# Patient Record
Sex: FEMALE | Race: WHITE
[De-identification: names, ages, dates, MRNs, and addresses within clinical notes are randomized per-mention and may not be internally consistent; named-entity substitution may affect disease eponyms.]

---

## 2017-05-10 ENCOUNTER — HOSPITAL ENCOUNTER (EMERGENCY)
Dept: HOSPITAL 47 - EC | Age: 52
Discharge: HOME | End: 2017-05-10
Payer: COMMERCIAL

## 2017-05-10 VITALS
TEMPERATURE: 98.5 F | DIASTOLIC BLOOD PRESSURE: 81 MMHG | HEART RATE: 71 BPM | SYSTOLIC BLOOD PRESSURE: 140 MMHG | RESPIRATION RATE: 18 BRPM

## 2017-05-10 DIAGNOSIS — T25.222A: ICD-10-CM
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
## 2017-05-10 DIAGNOSIS — T25.221A: Primary | ICD-10-CM
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
## 2017-05-10 DIAGNOSIS — T31.0: ICD-10-CM

## 2017-05-10 DIAGNOSIS — Z88.1: ICD-10-CM

## 2017-05-10 PROCEDURE — 99283 EMERGENCY DEPT VISIT LOW MDM: CPT

## 2017-05-10 NOTE — ED
General Adult HPI





- General


Chief complaint: Burn/Smoke Inhalation


Stated complaint: [facility]/ Hot Liquid/ on feet


Time Seen by Provider: 05/10/17 19:47


Source: patient, RN notes reviewed


Mode of arrival: ambulatory


Limitations: no limitations





- History of Present Illness


Initial comments: 





Patient 51-year-old female who presents emergency room today with a chief 

complaint of a burn to the top of the feet bilaterally.  She does admit that 

around 11 AM this morning she was at work and spilled hot chicken soup down her 

left leg.  She does admit that he got into the socks both feet.  She admits 

that she did take it off and put ice.  She states that when she got home from 

work she noticed that there were some blisters to the first and second digit on 

the left foot along with a blister on the right foot.  Patient denies any other 

complaints or symptoms.  States is no other redness or blistering going down 

the leg.  She states her tetanus is up-to-date. Patient denies any recent fever

, chills, shortness of breath, chest pain, back pain, abdominal pain, nausea or 

vomiting, numbness or tingling, dysuria or hematuria, constipation or diarrhea, 

headaches or visual changes, or any other complaints.





- Related Data


 Previous Rx's











 Medication  Instructions  Recorded


 


Bacitracin Oint 28.4 gm TOPICAL BID #1 tube 05/10/17


 


Ibuprofen [Motrin] 600 mg PO Q6HR PRN #40 day 05/10/17











 Allergies











Allergy/AdvReac Type Severity Reaction Status Date / Time


 


cephalexin [From Keflex] Allergy  Swelling Verified 05/10/17 19:46














Review of Systems


ROS Statement: 


Those systems with pertinent positive or pertinent negative responses have been 

documented in the HPI.





ROS Other: All systems not noted in ROS Statement are negative.





Past Medical History


Past Medical History: No Reported History


History of Any Multi-Drug Resistant Organisms: None Reported


Past Surgical History: No Surgical Hx Reported


Past Psychological History: No Psychological Hx Reported


Smoking Status: Never smoker


Past Alcohol Use History: None Reported


Past Drug Use History: None Reported





General Exam





- General Exam Comments


Initial Comments: 





General:  The patient is awake and alert, in no distress, and does not appear 

acutely ill.   


Neck:  The neck is supple, there is no tenderness or JVD.  


Cardiovascular:  There is a regular rate and rhythm. No murmur, rub or gallop 

is appreciated.


Respiratory:  Lungs are clear to auscultation, respirations are non-labored, 

breath sounds are equal.  No wheezes, stridor, rales, or rhonchi.


Musculoskeletal: Range of motion.  Sensation intact.  Pulses equal bilaterally 2

+.  Strength 5/5.  Cap refill less than 2 seconds.


Neurological:  A&O x 3. CN II-XII intact, There are no obvious motor or sensory 

deficits. Coordination appears grossly intact. Speech is normal.


Skin:  Does have some mild redness to the medial aspect anteriorly of the right 

foot.  There is a blister measuring approximately a centimeter and 5:30 0.5 cm.

  Fluid-filled.  Second smaller blister measuring approximately half centimeter 

in size just medial to the first.  Patient does have blister locally over the 

first digit of the left foot measuring approximately centimeter across.  There 

are 2 small 0.5 cm blisters to the second digit of the left foot.  There is no 

circumferential redness or wound.  Cap refill less than 2 seconds.


Psychiatric:  Normal mood and affect.  


Limitations: no limitations





Course





 Vital Signs











  05/10/17





  19:42


 


Temperature 98.5 F


 


Pulse Rate 71


 


Respiratory 18





Rate 


 


Blood Pressure 140/81


 


O2 Sat by Pulse 100





Oximetry 














Medical Decision Making





- Medical Decision Making





His tetanus is up-to-date.  Patient will be placed on topical antibiotic given 

anti-inflammatories for pain.  She is advised to follow-up the family doctor 

and also given information of following up with her.  Patient advised to return 

to emergency room if any symptoms increase or worsen.





Disposition


Clinical Impression: 


 2Nd degree burn





Disposition: HOME SELF-CARE


Condition: Good


Instructions:  Second Degree Burn (ED)


Additional Instructions: 


Please use topical antibiotic as prescribed and pain medication as needed.  

Please call family doctor tomorrow hour also the burn center as discussed.  

Please return to emergency room if any symptoms increase or worsen or for any 

other concerns.


Prescriptions: 


Bacitracin Oint 28.4 gm TOPICAL BID #1 tube


Ibuprofen [Motrin] 600 mg PO Q6HR PRN #40 day


 PRN Reason: Pain


Time of Disposition: 19:58

## 2018-03-16 ENCOUNTER — HOSPITAL ENCOUNTER (OUTPATIENT)
Dept: HOSPITAL 47 - LABWHC1 | Age: 53
Discharge: HOME | End: 2018-03-16
Attending: INTERNAL MEDICINE
Payer: COMMERCIAL

## 2018-03-16 DIAGNOSIS — D50.9: Primary | ICD-10-CM

## 2018-03-16 LAB
BASOPHILS # BLD AUTO: 0.1 K/UL (ref 0–0.2)
BASOPHILS NFR BLD AUTO: 1 %
EOSINOPHIL # BLD AUTO: 0.1 K/UL (ref 0–0.7)
EOSINOPHIL NFR BLD AUTO: 2 %
ERYTHROCYTE [DISTWIDTH] IN BLOOD BY AUTOMATED COUNT: 5.01 M/UL (ref 3.8–5.4)
ERYTHROCYTE [DISTWIDTH] IN BLOOD: 17.1 % (ref 11.5–15.5)
HCT VFR BLD AUTO: 40.2 % (ref 34–46)
HGB BLD-MCNC: 14 GM/DL (ref 11.4–16)
LYMPHOCYTES # SPEC AUTO: 1.4 K/UL (ref 1–4.8)
LYMPHOCYTES NFR SPEC AUTO: 28 %
MCH RBC QN AUTO: 28 PG (ref 25–35)
MCHC RBC AUTO-ENTMCNC: 34.9 G/DL (ref 31–37)
MCV RBC AUTO: 80.3 FL (ref 80–100)
MONOCYTES # BLD AUTO: 0.3 K/UL (ref 0–1)
MONOCYTES NFR BLD AUTO: 6 %
NEUTROPHILS # BLD AUTO: 3.1 K/UL (ref 1.3–7.7)
NEUTROPHILS NFR BLD AUTO: 62 %
PLATELET # BLD AUTO: 321 K/UL (ref 150–450)
WBC # BLD AUTO: 5.1 K/UL (ref 3.8–10.6)

## 2018-03-16 PROCEDURE — 85025 COMPLETE CBC W/AUTO DIFF WBC: CPT

## 2018-03-16 PROCEDURE — 85045 AUTOMATED RETICULOCYTE COUNT: CPT

## 2018-03-16 PROCEDURE — 82728 ASSAY OF FERRITIN: CPT

## 2018-03-16 PROCEDURE — 83550 IRON BINDING TEST: CPT

## 2018-03-16 PROCEDURE — 83540 ASSAY OF IRON: CPT

## 2018-03-16 PROCEDURE — 36415 COLL VENOUS BLD VENIPUNCTURE: CPT

## 2019-07-02 ENCOUNTER — HOSPITAL ENCOUNTER (OUTPATIENT)
Dept: HOSPITAL 47 - LABWHC1 | Age: 54
Discharge: HOME | End: 2019-07-02
Attending: INTERNAL MEDICINE
Payer: COMMERCIAL

## 2019-07-02 DIAGNOSIS — D50.9: Primary | ICD-10-CM

## 2019-07-02 LAB
ALBUMIN SERPL-MCNC: 4.1 G/DL (ref 3.8–4.9)
ALBUMIN/GLOB SERPL: 2.05 G/DL (ref 1.6–3.17)
ALP SERPL-CCNC: 67 U/L (ref 41–126)
ALT SERPL-CCNC: 15 U/L (ref 8–44)
ANION GAP SERPL CALC-SCNC: 10.6 MMOL/L (ref 4–12)
AST SERPL-CCNC: 30 U/L (ref 13–35)
BUN SERPL-SCNC: 14 MG/DL (ref 9–27)
BUN/CREAT SERPL: 17.5 RATIO (ref 12–20)
CALCIUM SPEC-MCNC: 9.8 MG/DL (ref 8.7–10.3)
CHLORIDE SERPL-SCNC: 104 MMOL/L (ref 96–109)
CO2 SERPL-SCNC: 25.4 MMOL/L (ref 21.6–31.8)
ERYTHROCYTE [DISTWIDTH] IN BLOOD BY AUTOMATED COUNT: 4.81 M/UL (ref 3.8–5.4)
ERYTHROCYTE [DISTWIDTH] IN BLOOD: 13.8 % (ref 11.5–15.5)
GLOBULIN SER CALC-MCNC: 2 G/DL (ref 1.6–3.3)
GLUCOSE SERPL-MCNC: 89 MG/DL (ref 70–110)
HCT VFR BLD AUTO: 38.4 % (ref 34–46)
HGB BLD-MCNC: 12.3 GM/DL (ref 11.4–16)
MCH RBC QN AUTO: 25.7 PG (ref 25–35)
MCHC RBC AUTO-ENTMCNC: 32.2 G/DL (ref 31–37)
MCV RBC AUTO: 79.8 FL (ref 80–100)
PLATELET # BLD AUTO: 345 K/UL (ref 150–450)
POTASSIUM SERPL-SCNC: 3.6 MMOL/L (ref 3.5–5.5)
PROT SERPL-MCNC: 6.1 G/DL (ref 6.2–8.2)
SODIUM SERPL-SCNC: 140 MMOL/L (ref 135–145)
WBC # BLD AUTO: 4.5 K/UL (ref 3.8–10.6)

## 2019-07-02 PROCEDURE — 36415 COLL VENOUS BLD VENIPUNCTURE: CPT

## 2019-07-02 PROCEDURE — 83550 IRON BINDING TEST: CPT

## 2019-07-02 PROCEDURE — 82728 ASSAY OF FERRITIN: CPT

## 2019-07-02 PROCEDURE — 83540 ASSAY OF IRON: CPT

## 2019-07-02 PROCEDURE — 85027 COMPLETE CBC AUTOMATED: CPT

## 2019-07-02 PROCEDURE — 80053 COMPREHEN METABOLIC PANEL: CPT

## 2019-09-03 ENCOUNTER — HOSPITAL ENCOUNTER (OUTPATIENT)
Dept: HOSPITAL 47 - LABWHC1 | Age: 54
Discharge: HOME | End: 2019-09-03
Attending: INTERNAL MEDICINE
Payer: COMMERCIAL

## 2019-09-03 DIAGNOSIS — D50.9: Primary | ICD-10-CM

## 2019-09-03 LAB
ERYTHROCYTE [DISTWIDTH] IN BLOOD BY AUTOMATED COUNT: 5.3 M/UL (ref 3.8–5.4)
ERYTHROCYTE [DISTWIDTH] IN BLOOD: 17.7 % (ref 11.5–15.5)
GLIADIN IGA SER-ACNC: <0.2 U/ML
HCT VFR BLD AUTO: 44.5 % (ref 34–46)
HGB BLD-MCNC: 14.9 GM/DL (ref 11.4–16)
MCH RBC QN AUTO: 28.1 PG (ref 25–35)
MCHC RBC AUTO-ENTMCNC: 33.4 G/DL (ref 31–37)
MCV RBC AUTO: 84.1 FL (ref 80–100)
PLATELET # BLD AUTO: 300 K/UL (ref 150–450)
WBC # BLD AUTO: 5.8 K/UL (ref 3.8–10.6)

## 2019-09-03 PROCEDURE — 82728 ASSAY OF FERRITIN: CPT

## 2019-09-03 PROCEDURE — 83516 IMMUNOASSAY NONANTIBODY: CPT

## 2019-09-03 PROCEDURE — 85027 COMPLETE CBC AUTOMATED: CPT

## 2019-09-03 PROCEDURE — 83540 ASSAY OF IRON: CPT

## 2019-09-03 PROCEDURE — 36415 COLL VENOUS BLD VENIPUNCTURE: CPT

## 2019-09-03 PROCEDURE — 83550 IRON BINDING TEST: CPT

## 2019-11-04 ENCOUNTER — HOSPITAL ENCOUNTER (OUTPATIENT)
Dept: HOSPITAL 47 - LABWHC1 | Age: 54
Discharge: HOME | End: 2019-11-04
Attending: INTERNAL MEDICINE
Payer: COMMERCIAL

## 2019-11-04 DIAGNOSIS — D50.9: Primary | ICD-10-CM

## 2019-11-04 LAB
BASOPHILS # BLD AUTO: 0.1 K/UL (ref 0–0.2)
BASOPHILS NFR BLD AUTO: 1 %
EOSINOPHIL # BLD AUTO: 0.2 K/UL (ref 0–0.7)
EOSINOPHIL NFR BLD AUTO: 3 %
ERYTHROCYTE [DISTWIDTH] IN BLOOD BY AUTOMATED COUNT: 5.1 M/UL (ref 3.8–5.4)
ERYTHROCYTE [DISTWIDTH] IN BLOOD: 14.3 % (ref 11.5–15.5)
FERRITIN SERPL-MCNC: 29.7 NG/ML (ref 10–291)
HCT VFR BLD AUTO: 44.4 % (ref 34–46)
HGB BLD-MCNC: 14.9 GM/DL (ref 11.4–16)
IRON SERPL-MCNC: 78 UG/DL (ref 50–170)
LYMPHOCYTES # SPEC AUTO: 1.6 K/UL (ref 1–4.8)
LYMPHOCYTES NFR SPEC AUTO: 26 %
MCH RBC QN AUTO: 29.3 PG (ref 25–35)
MCHC RBC AUTO-ENTMCNC: 33.6 G/DL (ref 31–37)
MCV RBC AUTO: 87.1 FL (ref 80–100)
MONOCYTES # BLD AUTO: 0.3 K/UL (ref 0–1)
MONOCYTES NFR BLD AUTO: 6 %
NEUTROPHILS # BLD AUTO: 3.7 K/UL (ref 1.3–7.7)
NEUTROPHILS NFR BLD AUTO: 62 %
PLATELET # BLD AUTO: 323 K/UL (ref 150–450)
TIBC SERPL-MCNC: 366 UG/DL (ref 228–460)
WBC # BLD AUTO: 6 K/UL (ref 3.8–10.6)

## 2019-11-04 PROCEDURE — 83540 ASSAY OF IRON: CPT

## 2019-11-04 PROCEDURE — 85025 COMPLETE CBC W/AUTO DIFF WBC: CPT

## 2019-11-04 PROCEDURE — 82728 ASSAY OF FERRITIN: CPT

## 2019-11-04 PROCEDURE — 36415 COLL VENOUS BLD VENIPUNCTURE: CPT

## 2019-11-04 PROCEDURE — 83550 IRON BINDING TEST: CPT

## 2020-01-24 ENCOUNTER — HOSPITAL ENCOUNTER (OUTPATIENT)
Dept: HOSPITAL 47 - LABWHC1 | Age: 55
Discharge: HOME | End: 2020-01-24
Attending: INTERNAL MEDICINE
Payer: COMMERCIAL

## 2020-01-24 DIAGNOSIS — Z91.02: Primary | ICD-10-CM

## 2020-01-24 DIAGNOSIS — D50.9: ICD-10-CM

## 2020-01-24 LAB
BASOPHILS # BLD AUTO: 0.1 K/UL (ref 0–0.2)
BASOPHILS NFR BLD AUTO: 1 %
EOSINOPHIL # BLD AUTO: 0.2 K/UL (ref 0–0.7)
EOSINOPHIL NFR BLD AUTO: 3 %
ERYTHROCYTE [DISTWIDTH] IN BLOOD BY AUTOMATED COUNT: 5.56 M/UL (ref 3.8–5.4)
ERYTHROCYTE [DISTWIDTH] IN BLOOD: 12.8 % (ref 11.5–15.5)
FERRITIN SERPL-MCNC: 18.1 NG/ML (ref 10–291)
HCT VFR BLD AUTO: 48.8 % (ref 34–46)
HGB BLD-MCNC: 15.8 GM/DL (ref 11.4–16)
IRON SERPL-MCNC: 104 UG/DL (ref 50–170)
LYMPHOCYTES # SPEC AUTO: 1.4 K/UL (ref 1–4.8)
LYMPHOCYTES NFR SPEC AUTO: 25 %
MCH RBC QN AUTO: 28.4 PG (ref 25–35)
MCHC RBC AUTO-ENTMCNC: 32.4 G/DL (ref 31–37)
MCV RBC AUTO: 87.8 FL (ref 80–100)
MONOCYTES # BLD AUTO: 0.4 K/UL (ref 0–1)
MONOCYTES NFR BLD AUTO: 7 %
NEUTROPHILS # BLD AUTO: 3.6 K/UL (ref 1.3–7.7)
NEUTROPHILS NFR BLD AUTO: 62 %
PLATELET # BLD AUTO: 319 K/UL (ref 150–450)
TIBC SERPL-MCNC: 392 UG/DL (ref 228–460)
WBC # BLD AUTO: 5.7 K/UL (ref 3.8–10.6)

## 2020-01-24 PROCEDURE — 85025 COMPLETE CBC W/AUTO DIFF WBC: CPT

## 2020-01-24 PROCEDURE — 83550 IRON BINDING TEST: CPT

## 2020-01-24 PROCEDURE — 82728 ASSAY OF FERRITIN: CPT

## 2020-01-24 PROCEDURE — 36415 COLL VENOUS BLD VENIPUNCTURE: CPT

## 2020-01-24 PROCEDURE — 83540 ASSAY OF IRON: CPT

## 2021-03-31 ENCOUNTER — HOSPITAL ENCOUNTER (OUTPATIENT)
Dept: HOSPITAL 47 - RADMAMWWP | Age: 56
Discharge: HOME | End: 2021-03-31
Attending: FAMILY MEDICINE
Payer: COMMERCIAL

## 2021-03-31 DIAGNOSIS — Z80.3: ICD-10-CM

## 2021-03-31 DIAGNOSIS — Z12.31: Primary | ICD-10-CM

## 2021-03-31 DIAGNOSIS — Z78.0: ICD-10-CM

## 2021-03-31 PROCEDURE — 77067 SCR MAMMO BI INCL CAD: CPT

## 2021-04-02 NOTE — MM
Reason for exam: screening  (asymptomatic).

Last mammogram was performed 2 years and 6 months ago.



History:

Patient is postmenopausal.

Family history of breast cancer in aunt at age 50.

Took hormonal contraceptives for 2 years.



Physical Findings:

A clinical breast exam by your physician is recommended on an annual basis and 

results should be correlated with mammographic findings.



MG Screening Mammo w CAD

Bilateral CC and MLO view(s) were taken.

Prior study comparison: October 3, 2018, mammogram, performed at [facility].  

September 15, 2016, mammogram, performed at [facility].

There are scattered fibroglandular densities.  There is no discrete abnormality.  

No significant changes when compared with prior studies.





ASSESSMENT: Negative, BI-RAD 1



RECOMMENDATION:

Routine screening mammogram of both breasts in 1 year.